# Patient Record
Sex: MALE
[De-identification: names, ages, dates, MRNs, and addresses within clinical notes are randomized per-mention and may not be internally consistent; named-entity substitution may affect disease eponyms.]

---

## 2019-03-21 PROBLEM — Z00.00 ENCOUNTER FOR PREVENTIVE HEALTH EXAMINATION: Status: ACTIVE | Noted: 2019-03-21

## 2019-03-22 ENCOUNTER — APPOINTMENT (OUTPATIENT)
Dept: OTOLARYNGOLOGY | Facility: CLINIC | Age: 64
End: 2019-03-22
Payer: COMMERCIAL

## 2019-03-22 VITALS — BODY MASS INDEX: 23.7 KG/M2 | WEIGHT: 175 LBS | HEIGHT: 72 IN

## 2019-03-22 DIAGNOSIS — H61.20 IMPACTED CERUMEN, UNSPECIFIED EAR: ICD-10-CM

## 2019-03-22 PROCEDURE — 99213 OFFICE O/P EST LOW 20 MIN: CPT | Mod: 25

## 2019-03-22 PROCEDURE — 69210 REMOVE IMPACTED EAR WAX UNI: CPT

## 2019-03-22 NOTE — CONSULT LETTER
[Dear  ___] : Dear  [unfilled], [Courtesy Letter:] : I had the pleasure of seeing your patient, [unfilled], in my office today. [Please see my note below.] : Please see my note below. [Consult Closing:] : Thank you very much for allowing me to participate in the care of this patient.  If you have any questions, please do not hesitate to contact me. [Sincerely,] : Sincerely, [FreeTextEntry2] : Dayne Voss MD [FreeTextEntry3] : Suman Romero MD\par [facility]\par [facility]\par  [facility]\par \par

## 2019-03-22 NOTE — ASSESSMENT
[FreeTextEntry1] : It was my impression that he was doing well. He appears to be having significant improvement after the first injection and at this point I reinjected the left and injected on the right. I will see him back in followup in 3 weeks for the third injection. It seems likely due to respond enough to not require excision.\par He has a long history of sinus disease for which she has been doing well\par It was my impression that the patient had a cerumen impaction that was cleared.  I recommended topical moisturizing.  I recommended avoiding Q-tips.  I reviewed aural hygiene and the role of cerumen with the patient.  I suggested a repeat visit in a year or earlier should the need arise.

## 2019-03-22 NOTE — HISTORY OF PRESENT ILLNESS
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

## 2019-03-22 NOTE — PROCEDURE
[FreeTextEntry1] : Kenalog Injection [FreeTextEntry2] : Subcutaneous granulomas nasal dorsum [FreeTextEntry3] : Sterile technique and after all risks and benefits were discussed, a total of 1/2 cc of lidocaine with epinephrine 100,000 and Kenalog-40 were injected without complication.

## 2019-03-22 NOTE — PHYSICAL EXAM
[FreeTextEntry1] : General:\par The patient was alert and oriented and in no distress.\par Voice was clear.\par \par Face:\par The patient had no facial asymmetry or mass.\par The skin was unremarkable.\par \par Ears:\par  Procedure note:\par  Removal of Cerumen Impactions, both ears:  98813-80\par Both external ears were normal.\par There were symptomatic cerumen impactions in both ears.  These were cleared microscopically without trauma using both suction and curettes.  After clearing,  both ear canals were clear both eardrums were intact and mobile.  \par \par Oral cavity:\par The oral mucosa was normal.\par The oral and base of tongue were clear and without mass.\par The gingival and buccal mucosa were moist and without lesions.\par The palate moved well.\par There was no cleft to the palate.\par There appeared to be good salivary flow.  \par There was no pus, erythema or mass in the oral cavity.\par \par Nose:\par Externally, he still had the significant nasal valve collapse but this was improved.\par There was an area of subcutaneous thickening along the inferior nasal bone in the area just adjacent to the location of the former implants.\par \par Procedure:\par With sterile technique a total of 1/2 cc of Kenalog-40 and lidocaine with epinephrine 1-100,000 was injected into each nodule without complication.\par \par

## 2019-04-15 ENCOUNTER — APPOINTMENT (OUTPATIENT)
Dept: OTOLARYNGOLOGY | Facility: CLINIC | Age: 64
End: 2019-04-15
Payer: COMMERCIAL

## 2019-04-15 VITALS
SYSTOLIC BLOOD PRESSURE: 112 MMHG | DIASTOLIC BLOOD PRESSURE: 74 MMHG | WEIGHT: 175 LBS | HEIGHT: 72 IN | HEART RATE: 79 BPM | BODY MASS INDEX: 23.7 KG/M2

## 2019-04-15 PROCEDURE — 99213 OFFICE O/P EST LOW 20 MIN: CPT

## 2019-04-15 NOTE — ASSESSMENT
[FreeTextEntry1] : It was my impression that he was doing well. He appears to be having significant improvement after the two injections on the left- and the right appeqred to have resolved. I reinjected the left and injected on the right. I will see him back in followup in 3 months for re-evaluation It seems likely due to respond enough to not require excision.\par He has a long history of sinus disease for whichhe has been doing well\par .

## 2019-04-15 NOTE — REVIEW OF SYSTEMS
[Sneezing] : sneezing [Post Nasal Drip] : post nasal drip [Ear Itch] : ear itch [Nasal Congestion] : nasal congestion [Throat Clearing] : throat clearing [Abdominal Pain] : abdominal pain [Patient Intake Form Reviewed] : Patient intake form was reviewed

## 2019-04-15 NOTE — CONSULT LETTER
[Dear  ___] : Dear  [unfilled], [Courtesy Letter:] : I had the pleasure of seeing your patient, [unfilled], in my office today. [Please see my note below.] : Please see my note below. [Consult Closing:] : Thank you very much for allowing me to participate in the care of this patient.  If you have any questions, please do not hesitate to contact me. [Sincerely,] : Sincerely, [FreeTextEntry2] : Dayne Voss MD [FreeTextEntry3] : Suman Romero MD\par [facility]\par [facility]\par  [facility]\par \par

## 2019-04-15 NOTE — PHYSICAL EXAM
[FreeTextEntry1] : General:\par The patient was alert and oriented and in no distress.\par Voice was clear.\par \par Face:\par The patient had no facial asymmetry or mass.\par The skin was unremarkable.\par \par Ears:\par  Procedure note:\par  Removal of Cerumen Impactions, both ears:  23082-21\par Both external ears were normal.\par There were symptomatic cerumen impactions in both ears.  These were cleared microscopically without trauma using both suction and curettes.  After clearing,  both ear canals were clear both eardrums were intact and mobile.  \par \par Oral cavity:\par The oral mucosa was normal.\par The oral and base of tongue were clear and without mass.\par The gingival and buccal mucosa were moist and without lesions.\par The palate moved well.\par There was no cleft to the palate.\par There appeared to be good salivary flow.  \par There was no pus, erythema or mass in the oral cavity.\par \par Nose:\par Externally, he still had the significant nasal valve collapse but this was improved.\par There was an area of subcutaneous thickening along the inferior nasal bone in the area just adjacent to the location of the former implants.\par \par Procedure:\par With sterile technique a total of 1/2 cc of Kenalog-40 and lidocaine with epinephrine 1-100,000 was injected into each nodule without complication.\par \par

## 2019-04-15 NOTE — PROCEDURE
[FreeTextEntry1] : Kenalog Injection [FreeTextEntry2] : Subcutaneous granulomas nasal dorsum [FreeTextEntry3] : Sterile technique and after all risks and benefits were discussed, a total of 1/2 cc of lidocaine with epinephrine 100,000 and Kenalog-40 were injected without complication into the left sided lesion.\par .

## 2019-04-15 NOTE — HISTORY OF PRESENT ILLNESS
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 The right is no longer visibile to the patient, the left improved and he comes in for third injection\par He has a long history of sinus disease for which she has been doing well and also comes in today complaining of a cerumen impaction.\par He denies otalgia or otorrhea.\par The patient had no other ear nose or throat complaints at this visit.

## 2019-06-12 ENCOUNTER — APPOINTMENT (OUTPATIENT)
Dept: OTOLARYNGOLOGY | Facility: CLINIC | Age: 64
End: 2019-06-12
Payer: COMMERCIAL

## 2019-06-12 PROCEDURE — 99213 OFFICE O/P EST LOW 20 MIN: CPT | Mod: 25

## 2019-06-12 PROCEDURE — 31231 NASAL ENDOSCOPY DX: CPT

## 2019-06-12 RX ORDER — FLUTICASONE PROPIONATE 50 MCG
SPRAY, SUSPENSION NASAL
Refills: 0 | Status: ACTIVE | COMMUNITY

## 2019-06-12 NOTE — PHYSICAL EXAM
[FreeTextEntry1] : General:\par The patient was alert and oriented and in no distress.\par Voice was clear.\par \par Face:\par The patient had no facial asymmetry or mass.\par The skin was unremarkable.\par \par Ears:\par The external ears were normal without deformity.\par The ear canals were clear.\par The tympanic membranes were intact and normal.\par \par TMJ:\par The temporomandibular joints were nontender.\par There was no abnormal crepitus and no significant malocclusion\par \par \par Neck: \par The neck was symmetrical.\par The parotid and submandibular glands were normal without masses.\par The trachea was midline and there was no unusual crepitus.\par The thyroid was smooth and nontender and no masses were palpated.\par There was no significant cervical adenopathy.\par \par Nose:\par He had a significant persistent nasal valve collapse with positive Bedford maneuver\par \par Nasal endoscopy:\par \par Nasal endoscopy was done with topical anesthesia and a flexible endoscope to evaluate for nasal polyps, chronic sinusitis and response to therapy.\par Evaluation showed that the septum was midline.  There was no significant mucosal disease.  The inferior turbinates and middle turbinates were normal.  On both sides, the surgically opened ethmoids, antra, and frontal recesses were clear.  There was no evidence of polypoid recurrences and no purulence.  The mucosa was close to stage zero.  The nasopharynx was benign.    The middle and  superior meati were normal and the sphenoethmoidal recesses were without evidence of disease\par \par Procedure note\par The patient had a resolution of the right-sided nodule. He had a small amount of thickening persisting on the left. It was felt that he would benefit from repeat therapeutic injection.\par All risks and benefits were discussed\par The area was prepped in a sterile fashion.\par A combination of 1/2 cc of Kenalog 40 and lidocaine with epinephrine were placed intralesionally\par This was massaged and ice applied\par There were no complications

## 2019-06-12 NOTE — REVIEW OF SYSTEMS
[Sneezing] : sneezing [Nasal Congestion] : nasal congestion [Post Nasal Drip] : post nasal drip [Ear Pain] : ear pain [Sinus Pain] : sinus pain [Negative] : Heme/Lymph [Ear Itch] : ear itch [Throat Clearing] : throat clearing [Patient Intake Form Reviewed] : Patient intake form was reviewed [Abdominal Pain] : abdominal pain

## 2019-06-12 NOTE — CONSULT LETTER
[Dear  ___] : Dear  [unfilled], [Courtesy Letter:] : I had the pleasure of seeing your patient, [unfilled], in my office today. [Sincerely,] : Sincerely, [Consult Closing:] : Thank you very much for allowing me to participate in the care of this patient.  If you have any questions, please do not hesitate to contact me. [Please see my note below.] : Please see my note below. [FreeTextEntry2] : Dayne Voss MD [FreeTextEntry3] : Suman Romero MD\par [facility]\par [facility]\par  [facility]\par \par

## 2019-06-12 NOTE — HISTORY OF PRESENT ILLNESS
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 The right is no longer visibile to the patient, the left improved but not resolved.\par He still notes nasal congestion, especially at night and has an intermittent left sided otalgia.\par The patient had no other ear nose or throat complaints at this visit.

## 2019-06-12 NOTE — ASSESSMENT
[FreeTextEntry1] : It was my impression that he had the granulation from his implant that had improved but not completely resolved and was reinjected. I would just like to reevaluate this in another 3 months or so.\par He had a smaller one on the right that has responded completely.\par He has nasal airway obstruction and his sinuses look excellent.\par It still seemed to be much of this is from his nasal valve collapse which remains quite significant.\par His airway improved so with Afrin from the topical anesthetic and probably therefore is some mucosal component and I suggested going back to Flonase which she says has helped in the past.\par The option of nasal valve reconstruction with auricular cartilage was discussed but declined\par \par His intermittent otalgia is likely from his temporomandibular joint and this was discussed

## 2019-06-21 ENCOUNTER — RX RENEWAL (OUTPATIENT)
Age: 64
End: 2019-06-21

## 2019-06-21 RX ORDER — IPRATROPIUM BROMIDE 21 UG/1
0.03 SPRAY NASAL
Qty: 1 | Refills: 5 | Status: ACTIVE | COMMUNITY
Start: 2019-06-21 | End: 1900-01-01

## 2019-11-08 ENCOUNTER — APPOINTMENT (OUTPATIENT)
Dept: OTOLARYNGOLOGY | Facility: CLINIC | Age: 64
End: 2019-11-08
Payer: COMMERCIAL

## 2019-11-08 VITALS
SYSTOLIC BLOOD PRESSURE: 123 MMHG | BODY MASS INDEX: 23.7 KG/M2 | HEART RATE: 98 BPM | WEIGHT: 175 LBS | HEIGHT: 72 IN | DIASTOLIC BLOOD PRESSURE: 71 MMHG

## 2019-11-08 DIAGNOSIS — M26.609 UNSPECIFIED TEMPOROMANDIBULAR JOINT DISORDER: ICD-10-CM

## 2019-11-08 DIAGNOSIS — J32.9 CHRONIC SINUSITIS, UNSPECIFIED: ICD-10-CM

## 2019-11-08 DIAGNOSIS — J34.89 OTHER SPECIFIED DISORDERS OF NOSE AND NASAL SINUSES: ICD-10-CM

## 2019-11-08 DIAGNOSIS — L92.2: ICD-10-CM

## 2019-11-08 PROCEDURE — 96372 THER/PROPH/DIAG INJ SC/IM: CPT | Mod: 59

## 2019-11-08 PROCEDURE — 99213 OFFICE O/P EST LOW 20 MIN: CPT | Mod: 25

## 2019-11-08 PROCEDURE — 31231 NASAL ENDOSCOPY DX: CPT

## 2019-11-08 NOTE — HISTORY OF PRESENT ILLNESS
[de-identified] : SIMONA TRINH Was seen in followup on November 8. His notes that the latera granuloma on the left has returned.  The one on the right has not recurred.  He still notes that he has nasal obstruction at night. He has not had purulent discharge. He has a significant nasal valve collapse.\par The patient had no other ear nose or throat complaints at this visit.

## 2019-11-08 NOTE — REVIEW OF SYSTEMS
[Seasonal Allergies] : seasonal allergies [Sneezing] : sneezing [Sinus Pressure] : sinus pressure [Sinus Pain] : sinus pain [Post Nasal Drip] : post nasal drip [Nasal Congestion] : nasal congestion [Chills] : chills [Negative] : Heme/Lymph

## 2019-11-08 NOTE — ASSESSMENT
[FreeTextEntry1] : It was my impression that his sinuses were crystal clear and he has had no recurrence of his sinus disease and I suggested continuing on his current regimen.\par He still has a significant nasal valve collapse and the option of auricular cartilage reconstruction was offered\par Otherwise, he is not bothered during the day and can use Breathe Right strips at night \par He has a recurrence of the granulation tissue, likely from the latera implant on the left.\par This previously resolved with 3 lidocaine with Kenalog injections. I would plan that again, and if not successful will excise. I recommended repeat treatment in 3-4 weeks

## 2019-11-08 NOTE — PHYSICAL EXAM
[FreeTextEntry1] : \par The patient was alert and oriented and in no distress.\par Voice was clear.\par \par Face:\par The patient had no facial asymmetry or mass.\par The skin was unremarkable.\par \par Eyes:\par The pupils were equal round and reactive to light and accommodation.\par There was no significant nystagmus or disconjugate gaze noted.\par \par Nose: \par He has a raised nontender subcutaneous mass over the left nasal bone laterally at the margin of his previous latera implant. He has no right-sided recurrence.\par He has marked persisting, however somewhat improved, nasal valve collapse with positive Avery\par \par Oral cavity:\par The oral mucosa was normal.\par The oral and base of tongue were clear and without mass.\par The gingival and buccal mucosa were moist and without lesions.\par The palate moved well.\par There was no cleft to the palate.\par There appeared to be good salivary flow.  \par There was no pus, erythema or mass in the oral cavity.\par \par \par Ears:\par The external ears were normal without deformity.\par The ear canals were clear.\par The tympanic membranes were intact and normal.\par \par Neck: \par The neck was symmetrical.\par The parotid and submandibular glands were normal without masses.\par The trachea was midline and there was no unusual crepitus.\par The thyroid was smooth and nontender and no masses were palpated.\par There was no significant cervical adenopathy.\par \par \par Neuro:\par Neurologically, the patient was awake, alert, and oriented to person, place and time. There were no obvious focal neurologic abnormalities.  Cranial nerves II through XII were grossly intact.\par \par \par TMJ:\par The temporomandibular joints were nontender.\par There was no abnormal crepitus and no significant malocclusion\par Nasal endoscopy: \par CPT 19679\par Procedure Note:\par \par Nasal endoscopy was done with topical anesthesia of Pontocaine and Afrin and a      nasal endoscope.\par Indication: Nasal congestion, rule out sinusitis.\par Procedure: The nasal cavity was anesthetized with topical Afrin and Pontocaine. An  endoscope was used and inserted into the nasal cavity.\par Attention was first paid to the anterior nasal cavity.\par Endocoscopy was performed to inspect the interior of the nasal cavity, the nasal septum,  the middle and superior meati, the inferior, middle and superior turbinates, and the spheno-ethmoidal  recesses, the nasopharynx and eustachian tube orifices bilaterally. \par All findings were normal except:\par All his sinuses were widely opened without evidence of recurrent sinusitis or polypoid disease and the mucosa was Stage 0                                                          Procedure:\par With sterile precautions  granuloma the apparent granuloma was injected with 1/2 cc of Kenalog 40-1/2 cc of lidocaine with epinephrine without complication.

## 2024-03-14 ENCOUNTER — NON-APPOINTMENT (OUTPATIENT)
Age: 69
End: 2024-03-14

## 2024-03-14 ENCOUNTER — APPOINTMENT (OUTPATIENT)
Dept: OPHTHALMOLOGY | Facility: CLINIC | Age: 69
End: 2024-03-14
Payer: COMMERCIAL

## 2024-03-14 PROCEDURE — 92004 COMPRE OPH EXAM NEW PT 1/>: CPT

## 2024-09-12 ENCOUNTER — NON-APPOINTMENT (OUTPATIENT)
Age: 69
End: 2024-09-12

## 2024-09-12 ENCOUNTER — APPOINTMENT (OUTPATIENT)
Dept: OPHTHALMOLOGY | Facility: CLINIC | Age: 69
End: 2024-09-12
Payer: COMMERCIAL

## 2024-09-12 PROCEDURE — 92012 INTRM OPH EXAM EST PATIENT: CPT

## 2024-10-01 PROBLEM — J34.829 COLLAPSE OF NASAL VALVE: Status: ACTIVE | Noted: 2019-06-12

## 2025-03-13 ENCOUNTER — APPOINTMENT (OUTPATIENT)
Dept: OPHTHALMOLOGY | Facility: CLINIC | Age: 70
End: 2025-03-13

## 2025-05-23 ENCOUNTER — APPOINTMENT (OUTPATIENT)
Dept: OPHTHALMOLOGY | Facility: CLINIC | Age: 70
End: 2025-05-23

## 2025-05-23 ENCOUNTER — NON-APPOINTMENT (OUTPATIENT)
Age: 70
End: 2025-05-23

## 2025-05-23 PROCEDURE — 92012 INTRM OPH EXAM EST PATIENT: CPT
